# Patient Record
Sex: MALE | Race: WHITE | NOT HISPANIC OR LATINO | URBAN - NONMETROPOLITAN AREA
[De-identification: names, ages, dates, MRNs, and addresses within clinical notes are randomized per-mention and may not be internally consistent; named-entity substitution may affect disease eponyms.]

---

## 2020-03-30 ENCOUNTER — OFFICE VISIT (OUTPATIENT)
Dept: OTOLARYNGOLOGY | Facility: CLINIC | Age: 70
End: 2020-03-30

## 2020-03-30 VITALS — OXYGEN SATURATION: 99 % | WEIGHT: 144.8 LBS | BODY MASS INDEX: 20.73 KG/M2 | TEMPERATURE: 97.1 F | HEIGHT: 70 IN

## 2020-03-30 DIAGNOSIS — B02.8: Primary | ICD-10-CM

## 2020-03-30 PROCEDURE — 99203 OFFICE O/P NEW LOW 30 MIN: CPT | Performed by: OTOLARYNGOLOGY

## 2020-03-30 RX ORDER — ASPIRIN 81 MG/1
81 TABLET ORAL DAILY
COMMUNITY

## 2020-03-30 RX ORDER — NAPROXEN SODIUM 220 MG
220 TABLET ORAL 2 TIMES DAILY PRN
COMMUNITY

## 2020-03-30 NOTE — PROGRESS NOTES
"Subjective   Morteza Veras is a 69 y.o. male.     History of Present Illness     Patient reports that approximately 2 weeks ago he began having right-sided facial pain and then subsequently sharp shooting right-sided ear pain.  Was given some Allegra-D and then a little over a week ago states his right ear became \"plugged\".  Was seen at [facility] urgent care on 3/26/1930 and diagnosed with \"shingles in my ear\".  Was placed on Valtrex and Cortisporin.  Along with Keflex.  Reports he is much improved.  Feels like his hearing is almost back to what he considers normal.  Has several lesions on the right side of his face particularly around the chin and lip and also has a lesion on his ear externally.  None of these are bleeding.  Pain has improved significantly.    The following portions of the patient's history were reviewed and updated as appropriate: allergies, current medications, past family history, past medical history, past social history, past surgical history and problem list.      Morteza Veras reports that he has been smoking cigarettes. He started smoking about 40 years ago. He has a 40.00 pack-year smoking history. He has never used smokeless tobacco. Alcohol use questions deferred to the physician. Drug use questions deferred to the physician.  Patient is a tobacco user and has been counseled for use of tobacco products    History reviewed. No pertinent family history.    No Known Allergies      Current Outpatient Medications:   •  aspirin 81 MG EC tablet, Take 81 mg by mouth Daily., Disp: , Rfl:   •  ibuprofen (ADVIL,MOTRIN) 100 MG/5ML suspension, Take  by mouth Every 6 (Six) Hours As Needed for Mild Pain ., Disp: , Rfl:   •  naproxen sodium (ALEVE) 220 MG tablet, Take 220 mg by mouth 2 (Two) Times a Day As Needed., Disp: , Rfl:   •  cephalexin (KEFLEX) 500 MG capsule, Take 1 capsule by mouth 3 (Three) Times a Day., Disp: 21 capsule, Rfl: 0  •  neomycin-colistin-hydrocortisone-thonzonium " (CORTISPORIN TC) 3.3-3-10-0.5 MG/ML otic suspension, Administer 3 drops to the right ear 4 (Four) Times a Day for 7 days., Disp: 5 mL, Rfl: 0  •  valACYclovir (VALTREX) 1000 MG tablet, Take 1 tablet by mouth 3 (Three) Times a Day., Disp: 21 tablet, Rfl: 0    Past Medical History:   Diagnosis Date   • COPD (chronic obstructive pulmonary disease) (CMS/HCC)        Past Surgical History:   Procedure Laterality Date   • KNEE SURGERY           Review of Systems   HENT: Positive for ear discharge, ear pain, hearing loss and mouth sores.    Skin: Positive for rash.   Neurological: Positive for headaches.   Hematological: Bruises/bleeds easily.   Psychiatric/Behavioral:        Not assessed           Objective   Physical Exam  General: Well-developed well-nourished male in no acute distress.  Alert and oriented x3. Head: Normocephalic. Face: Facial nerve function is intact and symmetrical bilaterally. PERRL. EOMI. Voice:Strong. Speech:Fluent  Ears: The right external ear shows a crusted lesion of the root of the helix externally and at least 2 bullous lesions within the ear canal.  Tympanic membrane appears to be intact with no evidence of infection or effusion.  Left ear no discharge.  Tympanic membrane intact and clear.  Nose: Nares show no discharge mass polyp or purulence.  Boggy mucosa is present.  No gross external deformity.  Septum: Midline  Oral cavity: Lips and gums without lesions.  Tongue and floor of mouth without lesions.  Parotid and submandibular ducts unobstructed.  Has a couple of whitish lesions of the vestibule of the mouth on the right consistent with viral/herpetiform lesions.  Pharynx: No erythema exudate mass or ulcer  Neck: No lymphadenopathy.  No thyromegaly.  Trachea and larynx midline.  No masses in the parotid or submandibular glands.  Skin: Herpetiform crusted lesions of the right lower face extending up towards the vermilion border.      Assessment/Plan   Morteza was seen today for ear  problem.    Diagnoses and all orders for this visit:    Herpes zoster otitis externa      Plan: Findings are consistent with herpes zoster.  With the patient substantially improved subjectively will not add steroids at this point.  He should continue his valacyclovir until he has completed the course and continue eardrops twice a day for another 5 days.  Keep water out of his ear.  If he continues to improve he should return in 1 month at the [place] office with a hearing test to assess for any potential damage to/involvement of the 8th nerve.

## 2020-04-02 ENCOUNTER — TELEPHONE (OUTPATIENT)
Dept: OTOLARYNGOLOGY | Facility: CLINIC | Age: 70
End: 2020-04-02

## 2020-04-02 DIAGNOSIS — B02.29 HERPES ZOSTER VIRUS INFECTION OF FACE AND EAR NERVES: ICD-10-CM

## 2020-04-06 ENCOUNTER — TELEPHONE (OUTPATIENT)
Dept: OTOLARYNGOLOGY | Facility: CLINIC | Age: 70
End: 2020-04-06

## 2020-04-06 NOTE — TELEPHONE ENCOUNTER
04/06/2020, 1330 - Patient's pharmacy of choice, [facility], [place], [place] telephoned per this staff member [phone].  Spoke with Pharmacy Technician, Laila.  Laila made aware request for prescription medication renewal regarding Neomycin-Colistin-Hydrocortisone-Thonzonium (Cortisporin TC)3.3-3-10-0.5 MG/ML Otic Suspension was addressed per Dr. Colt Rodriguez M.D./ENT 04/02/2020.  Laila verbalized understanding.    Note - Receipt confirmed by [facility], [place], [place] 04/02/2020 at 10:06 A.M.

## 2020-04-06 NOTE — TELEPHONE ENCOUNTER
----- Message from Yaneth Mcintyre sent at 4/6/2020 11:32 AM CDT -----  Regarding: didi patient  Laila with [facility] in [place] called asking if okay  to substitution Neopoly HC drops again.  Was done verbally on 3-26.    [phone] (Laila)

## 2020-04-28 ENCOUNTER — OFFICE VISIT (OUTPATIENT)
Dept: OTOLARYNGOLOGY | Facility: CLINIC | Age: 70
End: 2020-04-28

## 2020-04-28 ENCOUNTER — CLINICAL SUPPORT (OUTPATIENT)
Dept: AUDIOLOGY | Facility: CLINIC | Age: 70
End: 2020-04-28

## 2020-04-28 VITALS — OXYGEN SATURATION: 98 % | WEIGHT: 146 LBS | BODY MASS INDEX: 20.9 KG/M2 | HEIGHT: 70 IN

## 2020-04-28 DIAGNOSIS — H83.3X3 NOISE-INDUCED HEARING LOSS OF BOTH EARS: ICD-10-CM

## 2020-04-28 DIAGNOSIS — H61.21 IMPACTED CERUMEN OF RIGHT EAR: ICD-10-CM

## 2020-04-28 DIAGNOSIS — H90.3 SENSORINEURAL HEARING LOSS OF BOTH EARS: Primary | ICD-10-CM

## 2020-04-28 DIAGNOSIS — H90.3 SENSORINEURAL HEARING LOSS, BILATERAL: Primary | ICD-10-CM

## 2020-04-28 DIAGNOSIS — H92.01 RIGHT EAR PAIN: ICD-10-CM

## 2020-04-28 PROCEDURE — 92567 TYMPANOMETRY: CPT | Performed by: AUDIOLOGIST

## 2020-04-28 PROCEDURE — 99213 OFFICE O/P EST LOW 20 MIN: CPT | Performed by: OTOLARYNGOLOGY

## 2020-04-28 PROCEDURE — 92557 COMPREHENSIVE HEARING TEST: CPT | Performed by: AUDIOLOGIST

## 2020-04-28 NOTE — PROGRESS NOTES
STANDARD AUDIOMETRIC EVALUATION      Name:  Morteza Veras  :  1950  Age:  69 y.o.  Date of Evaluation:  2020      HISTORY    Reason for visit:  Morteza Veras is seen today for a hearing test at the request of Dr. Colt Rodriguez.  Patient reports he had shingles in his right ear, and his hearing was stopped up for 4-5 days.  He states he doesn't have any more ear pain.  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        EVALUATION    See Audiogram    RESULTS        Otoscopy and Tympanometry 226 Hz :  Right Ear:  Otoscopy:  Cerumen impaction, Testing completed after ears were cleaned          Tympanometry:  Middle ear function within normal limits    Left Ear:   Otoscopy:  Clear ear canal        Tympanometry:  Middle ear function within normal limits    Test technique:  Standard Audiometry     Pure Tone Audiometry:   Patient responded to pure tones at 25-85 dB for 250-8000 Hz in right ear, and at 30-75 dB for 250-8000 Hz in left ear.       Speech Audiometry:        Right Ear:  Speech Reception Threshold (SRT) was obtained at 35 dBHL                 Speech Discrimination scores were 60% in quiet when words were presented at 75 dBHL       Left Ear:  Speech Reception Threshold (SRT) was obtained at 30 dBHL                 Speech Discrimination scores were 76% in quiet when words were presented at 70 dBHL    Reliability:   good    IMPRESSIONS:  1.  Tympanometry results are consistent with Middle ear function within normal limits in both ears.  2.  Pure tone results are consistent with mild to severe sloping sensorineural hearing loss  for both ears.       RECOMMENDATIONS:  Patient is seeing the Ear Nose and Throat physician immediately following this examination.  It was a pleasure seeing Morteza Veras in Audiology today.  We would be happy to do further testing or discuss these test as necessary.          This document has been electronically  signed by Christine Torres, MS HOUSE-A on April 28, 2020 15:33       Christine Torres, MS HOUSE-A  Licensed Audiologist

## 2020-04-28 NOTE — PROGRESS NOTES
Subjective   Morteza Veras is a 69 y.o. male.       History of Present Illness     Patient was seen previously with what appear to be herpes zoster of the ear canal.  Has completed his Valtrex and Cortisporin drops.  Thinks his right ear feels stopped up but is not hurting.  No otorrhea.  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    The following portions of the patient's history were reviewed and updated as appropriate: allergies, current medications, past family history, past medical history, past social history, past surgical history and problem list.     reports that he has been smoking cigarettes. He started smoking about 40 years ago. He has a 40.00 pack-year smoking history. He has never used smokeless tobacco. Alcohol use questions deferred to the physician. Drug use questions deferred to the physician.   Patient is a tobacco user and has been counseled for use of tobacco products      Review of Systems   Constitutional: Negative for fever.   HENT: Positive for hearing loss.            Objective   Physical Exam  Ears: External ears no deformity.  Left ear canal shows no discharge.  Tympanic membrane intact and clear.  Right ear canal shows extensive crusting that is occluding the ear canal and covering the tympanic membrane.  This is removed under the microscope using instrumentation.  Some modest bleeding occurs that is controlled with topical application of Jeremías-Synephrine on cotton followed by light cautery with silver nitrate.  Tympanic membrane is intact and clear.  There are no remaining external herpetiform lesions.    Audiogram is obtained and reviewed and shows a bilateral mild sloping to severe sensorineural hearing loss.  Tympanograms are type a bilaterally.  Discrimination scores are 76% on the left 60% on the right.  This is largely symmetrical.  Hearing is slightly worse in the high pitches from 2000 to 8000  Hz.          Assessment/Plan   Morteza was seen today for follow-up.    Diagnoses and all orders for this visit:    Sensorineural hearing loss of both ears        Plan: Ear cleaned as described above.  Reassured the patient there did not appear to be any substantial hearing loss that was caused by the zoster.  No further treatment needed from my standpoint.  Could consider amplification if he so desires.  Follow-up with me as needed.

## 2021-06-29 ENCOUNTER — OFFICE VISIT (OUTPATIENT)
Dept: FAMILY MEDICINE CLINIC | Facility: CLINIC | Age: 71
End: 2021-06-29

## 2021-06-29 VITALS
BODY MASS INDEX: 19.61 KG/M2 | DIASTOLIC BLOOD PRESSURE: 84 MMHG | TEMPERATURE: 96.4 F | SYSTOLIC BLOOD PRESSURE: 142 MMHG | WEIGHT: 137 LBS | OXYGEN SATURATION: 98 % | HEART RATE: 92 BPM | HEIGHT: 70 IN

## 2021-06-29 DIAGNOSIS — L03.115 CELLULITIS OF RIGHT LOWER EXTREMITY: ICD-10-CM

## 2021-06-29 DIAGNOSIS — Z80.1 FAMILY HISTORY OF LUNG CANCER: ICD-10-CM

## 2021-06-29 DIAGNOSIS — Z00.00 MEDICARE ANNUAL WELLNESS VISIT, SUBSEQUENT: Primary | ICD-10-CM

## 2021-06-29 DIAGNOSIS — Z11.59 NEED FOR HEPATITIS C SCREENING TEST: ICD-10-CM

## 2021-06-29 DIAGNOSIS — Z00.00 WELL ADULT EXAM: ICD-10-CM

## 2021-06-29 DIAGNOSIS — Z12.11 COLON CANCER SCREENING: ICD-10-CM

## 2021-06-29 DIAGNOSIS — Z72.0 TOBACCO ABUSE: Chronic | ICD-10-CM

## 2021-06-29 DIAGNOSIS — Z87.891 PERSONAL HISTORY OF NICOTINE DEPENDENCE: Chronic | ICD-10-CM

## 2021-06-29 DIAGNOSIS — Z80.0 FAMILY HISTORY OF COLON CANCER: ICD-10-CM

## 2021-06-29 DIAGNOSIS — Z12.11 ENCOUNTER FOR SCREENING FOR MALIGNANT NEOPLASM OF COLON: ICD-10-CM

## 2021-06-29 DIAGNOSIS — M21.622 TAILOR'S BUNIONETTE, LEFT: ICD-10-CM

## 2021-06-29 PROBLEM — M79.672 LEFT FOOT PAIN: Status: ACTIVE | Noted: 2020-12-11

## 2021-06-29 PROBLEM — M25.80 SESAMOIDITIS: Status: ACTIVE | Noted: 2020-12-11

## 2021-06-29 PROCEDURE — 96372 THER/PROPH/DIAG INJ SC/IM: CPT | Performed by: NURSE PRACTITIONER

## 2021-06-29 PROCEDURE — G0438 PPPS, INITIAL VISIT: HCPCS | Performed by: NURSE PRACTITIONER

## 2021-06-29 PROCEDURE — 99213 OFFICE O/P EST LOW 20 MIN: CPT | Performed by: NURSE PRACTITIONER

## 2021-06-29 PROCEDURE — 1125F AMNT PAIN NOTED PAIN PRSNT: CPT | Performed by: NURSE PRACTITIONER

## 2021-06-29 PROCEDURE — 1159F MED LIST DOCD IN RCRD: CPT | Performed by: NURSE PRACTITIONER

## 2021-06-29 PROCEDURE — 96160 PT-FOCUSED HLTH RISK ASSMT: CPT | Performed by: NURSE PRACTITIONER

## 2021-06-29 RX ORDER — CEFTRIAXONE 1 G/1
1 INJECTION, POWDER, FOR SOLUTION INTRAMUSCULAR; INTRAVENOUS ONCE
Status: COMPLETED | OUTPATIENT
Start: 2021-06-29 | End: 2021-06-29

## 2021-06-29 RX ORDER — SULFAMETHOXAZOLE AND TRIMETHOPRIM 800; 160 MG/1; MG/1
1 TABLET ORAL 2 TIMES DAILY
Qty: 20 TABLET | Refills: 0 | Status: SHIPPED | OUTPATIENT
Start: 2021-06-29 | End: 2021-07-09

## 2021-06-29 RX ADMIN — CEFTRIAXONE 1 G: 1 INJECTION, POWDER, FOR SOLUTION INTRAMUSCULAR; INTRAVENOUS at 10:13

## 2021-06-29 NOTE — PROGRESS NOTES
"Chief Complaint  Establish Care and Medicare Wellness-Initial Visit    Subjective          The patient presents today to establish care. He was seeing Dr. Avitia and recently had bunion surgery with podiatrist in [facility]. He is having increased pain, swelling, and redness with this feels like it could be infected. He has been doing Cologuard and now wonders if he needs a colonoscopy instead. Reports mother having had colon cancer.     He reports his great toe becomes stiff and numb. The patient has used Neosporin, washes the affected area with soap and water on the great toe, and he states that his surgery was 04/01/2021. He denies taking a low-dose aspirin, and he does takes Aleve. He adds that he was prescribed oxycodone from his surgery, 10 pills were prescribed and he states that he did not take them all following his surgery, however, he did take one on 06/26/2021. He denies fever. The patient has no allergies to medications.     Additionally, he reports that he smokes approximately 1 pack of cigarettes per day. The patient states that he had his last Cologuard approximately 1 year ago. He has had both doses of his COVID-19 vaccination. He is interested in tetanus and pneumonia vaccines as well.     Morteza Versa presents to [facility]  History of Present Illness    Objective   Vital Signs:   /84 (BP Location: Left arm, Patient Position: Sitting, Cuff Size: Adult)   Pulse 92   Temp 96.4 °F (35.8 °C) (Tympanic)   Ht 177.8 cm (70\")   Wt 62.1 kg (137 lb)   SpO2 98%   BMI 19.66 kg/m²     Physical Exam  Vitals and nursing note reviewed.   Constitutional:       General: He is not in acute distress.     Appearance: Normal appearance. He is normal weight. He is not ill-appearing, toxic-appearing or diaphoretic.   HENT:      Head: Normocephalic and atraumatic.   Neck:      Vascular: No carotid bruit.   Cardiovascular:      Rate and Rhythm: Normal rate " and regular rhythm.      Pulses: Normal pulses.      Heart sounds: Normal heart sounds. No murmur heard.   No friction rub. No gallop.    Pulmonary:      Effort: Pulmonary effort is normal. No respiratory distress.      Breath sounds: No stridor. Examination of the right-upper field reveals decreased breath sounds. Examination of the left-upper field reveals decreased breath sounds. Examination of the right-middle field reveals decreased breath sounds. Examination of the left-middle field reveals decreased breath sounds. Examination of the right-lower field reveals decreased breath sounds. Examination of the left-lower field reveals decreased breath sounds. Decreased breath sounds present. No wheezing, rhonchi or rales.      Comments: Lung fields are diminished throughout. Pulse ox on RA 98%  Musculoskeletal:         General: Tenderness present.   Skin:     General: Skin is warm and dry.      Findings: Erythema and wound present.             Comments: Medial aspect of right foot distally around where his previous bunion surgery was performed is edematous, erythematous, tender to palpation. Wound draining some light yellow drainage.    Neurological:      Mental Status: He is alert and oriented to person, place, and time.   Psychiatric:         Mood and Affect: Mood normal.         Behavior: Behavior normal.         Thought Content: Thought content normal.         Judgment: Judgment normal.        Result Review :                   Assessment and Plan    Diagnoses and all orders for this visit:    1. Medicare annual wellness visit, subsequent (Primary)    2. Encounter for screening for malignant neoplasm of colon  -     Ambulatory Referral For Screening Colonoscopy    3. Tailor's bunionette, left    4. Cellulitis of right lower extremity  -     cefTRIAXone (ROCEPHIN) injection 1 g    5. Well adult exam  -     CBC & Differential; Future  -     Comprehensive metabolic panel; Future  -     Lipid panel; Future    6. Family  history of colon cancer  Comments:  mother    Orders:  -     Ambulatory Referral For Screening Colonoscopy    7. Colon cancer screening  -     Ambulatory Referral For Screening Colonoscopy    8. Need for hepatitis C screening test  -     Hepatitis C Antibody; Future    9. Tobacco abuse  -     Cancel: XR Chest 2 View  -      CT Chest Low Dose Cancer Screening WO    10. Personal history of nicotine dependence   -      CT Chest Low Dose Cancer Screening WO    11. Family history of lung cancer  Comments:  sister    Other orders  -     sulfamethoxazole-trimethoprim (Bactrim DS) 800-160 MG per tablet; Take 1 tablet by mouth 2 (Two) Times a Day for 10 days.  Dispense: 20 tablet; Refill: 0      He will be referred for colonoscopy. He is given Rocephin 1 g in office today, tye well and is treated with Bactrim po as above. If symptoms should persist or worsen, he will return to clinic for follow-up. Advised to monitor symptoms closely and cleanse with soapy water and cover with bandage and use Neosporin.    Chest x-ray and lab work are ordered for him. The lab work will be drawn while fasting at his next convenience.     Health maintenance is reviewed. Lung cancer screening is ordered. We will hold on immunizations at this point until his infection clears. Tobacco cessation is also encouraged today. Medicare wellness is completed today. CXR DC'd order and low dose CT for lung cancer screening ordered instead.     All questions and concerns are addressed with understanding noted. The patient is in agreement to above plan.    I spent 33 minutes caring for Morteza on this date of service. This time includes time spent by me in the following activities:preparing for the visit, performing a medically appropriate examination and/or evaluation , counseling and educating the patient/family/caregiver, ordering medications, tests, or procedures, referring and communicating with other health care professionals  and documenting information  in the medical record  Follow Up   Return if symptoms worsen or fail to improve, for or sooner as needed, Recheck.  Patient was given instructions and counseling regarding his condition or for health maintenance advice. Please see specific information pulled into the AVS if appropriate.     Scribed for TRICIA Landin by Sarah Nava.  06/29/21   13:08 CDT    I have personally performed the services described in this document as scribed by the above individual, and it is both accurate and complete.  TRICIA Landin  6/29/2021  13:12 CDT

## 2021-06-29 NOTE — PROGRESS NOTES
The ABCs of the Annual Wellness Visit  Initial Medicare Wellness Visit    Chief Complaint   Patient presents with   • Establish Care   • Medicare Wellness-Initial Visit       Subjective   History of Present Illness:  Morteza Veras is a 70 y.o. male who presents for an Initial Medicare Wellness Visit.    HEALTH RISK ASSESSMENT    Recent Hospitalizations:  No hospitalization(s) within the last year.    Current Medical Providers:  Patient Care Team:  Morteza Feldman MD as PCP - General (Family Medicine)  Jose Miguel Chu DPM as Consulting Physician (Podiatry)    Smoking Status:  Social History     Tobacco Use   Smoking Status Current Every Day Smoker   • Packs/day: 1.00   • Years: 40.00   • Pack years: 40.00   • Types: Cigarettes   • Start date: 1980   Smokeless Tobacco Never Used       Alcohol Consumption:  Social History     Substance and Sexual Activity   Alcohol Use Defer       Depression Screen:   PHQ-2/PHQ-9 Depression Screening 6/29/2021   Little interest or pleasure in doing things 0   Feeling down, depressed, or hopeless 0   Total Score 0       Fall Risk Screen:  STEADI Fall Risk Assessment was completed, and patient is at LOW risk for falls.Assessment completed on:6/29/2021    Health Habits and Functional and Cognitive Screening:  Functional & Cognitive Status 6/29/2021   Do you have difficulty preparing food and eating? No   Do you have difficulty bathing yourself, getting dressed or grooming yourself? No   Do you have difficulty using the toilet? No   Do you have difficulty moving around from place to place? No   Do you have trouble with steps or getting out of a bed or a chair? No   Current Diet Well Balanced Diet   Dental Exam Not up to date   Eye Exam Up to date   Exercise (times per week) 7 times per week   Current Exercises Include Walking   Do you need help using the phone?  No   Are you deaf or do you have serious difficulty hearing?  No   Do you need help with transportation? No    Do you need help shopping? No   Do you need help preparing meals?  No   Do you need help with housework?  No   Do you need help with laundry? No   Do you need help taking your medications? No   Do you need help managing money? No   Do you ever drive or ride in a car without wearing a seat belt? No   Have you felt unusual stress, anger or loneliness in the last month? No   Who do you live with? Spouse   If you need help, do you have trouble finding someone available to you? No   Have you been bothered in the last four weeks by sexual problems? No   Do you have difficulty concentrating, remembering or making decisions? No         Does the patient have evidence of cognitive impairment? No    Asprin use counseling:Start ASA 81 mg daily     Age-appropriate Screening Schedule:  Refer to the list below for future screening recommendations based on patient's age, sex and/or medical conditions. Orders for these recommended tests are listed in the plan section. The patient has been provided with a written plan.    Health Maintenance   Topic Date Due   • TDAP/TD VACCINES (1 - Tdap) Never done   • INFLUENZA VACCINE  08/01/2021   • ZOSTER VACCINE  Completed          The following portions of the patient's history were reviewed and updated as appropriate: allergies, current medications, past family history, past medical history, past social history, past surgical history and problem list.    Outpatient Medications Prior to Visit   Medication Sig Dispense Refill   • aspirin 81 MG EC tablet Take 81 mg by mouth Daily.     • ibuprofen (ADVIL,MOTRIN) 100 MG/5ML suspension Take  by mouth Every 6 (Six) Hours As Needed for Mild Pain .     • naproxen sodium (ALEVE) 220 MG tablet Take 220 mg by mouth 2 (Two) Times a Day As Needed.       No facility-administered medications prior to visit.       Patient Active Problem List   Diagnosis   • Left foot pain   • Sesamoiditis   • Tailor's bunionette, left       Advanced Care Planning:  ACP  "discussion was declined by the patient. Patient does not have an advance directive, declines further assistance.    Review of Systems    Compared to one year ago, the patient feels his physical health is better.  Compared to one year ago, the patient feels his mental health is better.    Reviewed chart for potential of high risk medication in the elderly: yes  Reviewed chart for potential of harmful drug interactions in the elderly:yes    Objective         Vitals:    06/29/21 0922   BP: 142/84   BP Location: Left arm   Patient Position: Sitting   Cuff Size: Adult   Pulse: 92   Temp: 96.4 °F (35.8 °C)   TempSrc: Tympanic   SpO2: 98%   Weight: 62.1 kg (137 lb)   Height: 177.8 cm (70\")   PainSc:   3   PainLoc: Foot  Comment: right       Body mass index is 19.66 kg/m².  Discussed the patient's BMI with him. The BMI is in the acceptable range.    Physical Exam          Assessment/Plan   Medicare Risks and Personalized Health Plan  CMS Preventative Services Quick Reference  Advance Directive Discussion  Colon Cancer Screening  Dementia/Memory   Depression/Dysphoria  Diabetic Lab Screening   Fall Risk  Immunizations Discussed/Encouraged (specific immunizations; Td and Pneumococcal 23 )  Lung Cancer Risk  Osteoporosis Risk  Polypharmacy  Tobacco Use/Dependance (use dotphrase .tobaccocessation for documentation)    The above risks/problems have been discussed with the patient.  Pertinent information has been shared with the patient in the After Visit Summary.  Follow up plans and orders are seen below in the Assessment/Plan Section.    Diagnoses and all orders for this visit:    1. Medicare annual wellness visit, subsequent (Primary)    2. Encounter for screening for malignant neoplasm of colon  -     Ambulatory Referral For Screening Colonoscopy    3. Tailor's bunionette, left    4. Cellulitis of right lower extremity  -     cefTRIAXone (ROCEPHIN) injection 1 g    5. Well adult exam  -     CBC & Differential; Future  -     " Comprehensive metabolic panel; Future  -     Lipid panel; Future    6. Family history of colon cancer  Comments:  mother    Orders:  -     Ambulatory Referral For Screening Colonoscopy    7. Colon cancer screening  -     Ambulatory Referral For Screening Colonoscopy    8. Need for hepatitis C screening test  -     Hepatitis C Antibody; Future    9. Tobacco abuse  -     Cancel: XR Chest 2 View  -      CT Chest Low Dose Cancer Screening WO    10. Personal history of nicotine dependence   -      CT Chest Low Dose Cancer Screening WO    11. Family history of lung cancer  Comments:  sister    Other orders  -     sulfamethoxazole-trimethoprim (Bactrim DS) 800-160 MG per tablet; Take 1 tablet by mouth 2 (Two) Times a Day for 10 days.  Dispense: 20 tablet; Refill: 0      Follow Up:  Return if symptoms worsen or fail to improve, for or sooner as needed, Recheck.     An After Visit Summary and PPPS were given to the patient.

## 2021-07-14 DIAGNOSIS — Z87.891 PERSONAL HISTORY OF NICOTINE DEPENDENCE: ICD-10-CM

## 2021-07-14 DIAGNOSIS — F17.200 TOBACCO DEPENDENCE: Primary | ICD-10-CM

## 2021-07-14 DIAGNOSIS — R91.1 LUNG NODULE: ICD-10-CM

## 2022-06-07 ENCOUNTER — OFFICE VISIT (OUTPATIENT)
Dept: FAMILY MEDICINE CLINIC | Facility: CLINIC | Age: 72
End: 2022-06-07

## 2022-06-07 VITALS
HEIGHT: 70 IN | HEART RATE: 59 BPM | SYSTOLIC BLOOD PRESSURE: 138 MMHG | WEIGHT: 136.3 LBS | DIASTOLIC BLOOD PRESSURE: 70 MMHG | OXYGEN SATURATION: 100 % | BODY MASS INDEX: 19.51 KG/M2

## 2022-06-07 DIAGNOSIS — R29.898 WEAKNESS OF BOTH LOWER EXTREMITIES: Primary | ICD-10-CM

## 2022-06-07 DIAGNOSIS — R79.9 ABNORMAL FINDING OF BLOOD CHEMISTRY, UNSPECIFIED: ICD-10-CM

## 2022-06-07 DIAGNOSIS — R09.89 DECREASED DORSALIS PEDIS PULSE: ICD-10-CM

## 2022-06-07 DIAGNOSIS — R20.0 BILATERAL LEG NUMBNESS: ICD-10-CM

## 2022-06-07 DIAGNOSIS — Z12.5 PROSTATE CANCER SCREENING: ICD-10-CM

## 2022-06-07 DIAGNOSIS — Z13.6 SCREENING FOR AAA (ABDOMINAL AORTIC ANEURYSM): ICD-10-CM

## 2022-06-07 DIAGNOSIS — Z72.0 TOBACCO ABUSE: ICD-10-CM

## 2022-06-07 PROCEDURE — 99214 OFFICE O/P EST MOD 30 MIN: CPT | Performed by: NURSE PRACTITIONER

## 2022-06-07 NOTE — PROGRESS NOTES
Chief Complaint  Leg Pain (Both legs will feel heavy, and numb. Pt states it comes and goes about every day.)    Subjective        Morteza Veras presents to [facility] [place]     The patient presents today for evaluation of bilateral lower extremity heaviness and numbness. He reports bumping into each other at [employer] a couple of years ago and stated both his legs were heavy. The patient adds that he still has the heaviness in his legs, but states it is not every day. The patient adds that he did not have any symptoms walking in the parking lot from the car to inside the facility for his appointment. He relays that his symptoms are intermittent. He reports that he is still tired. The patient denies any back pain. He denies any shooting pain down to his foot. The patient adds  that he has some tingling, and at night his feet will twitch. He reports that if it is hot weather, he will have a few muscle spasms, and adds that he drinks pickle juice, and they are gone within ten seconds. He reports that he had a screening for peripheral arterial disease last week. He states that his insurance rep/health provider comes by once a year and checks his blood pressure, thorough his insurance [employer]. The patient relays that he has not had an ultrasound yet although he was told he likely had PAD and was advised to start taking an ASA 81mg. The patient adds that he has heaviness, and  some numbness from his hips to his knees on both legs. He states that his symptoms are 7 days a week, but it might not last over a minute or so each. He reports that he is smoking approximately 1 pack of cigarettes per day. The patient states that he has been smoking for 50 years. He reports that he would love to try the patch again.    He reports that his cholesterol may be up.    The patient reports that he has a lung scan scheduled for 07/11/2022. He reports that he will have another colon  "screening later this year. The patient is due for a AAA (abdominal aortic aneurysm) screening.    The following portions of the patient's history were reviewed and updated as appropriate: allergies, current medications, past family history, past medical history, past social history, past surgical history and problem list.    History of Present Illness     Objective   Vital Signs:  /70   Pulse 59   Ht 177.8 cm (70\")   Wt 61.8 kg (136 lb 4.8 oz)   SpO2 100%   BMI 19.56 kg/m²   Estimated body mass index is 19.56 kg/m² as calculated from the following:    Height as of this encounter: 177.8 cm (70\").    Weight as of this encounter: 61.8 kg (136 lb 4.8 oz).    Physical Exam  Vitals and nursing note reviewed.   Constitutional:       General: He is not in acute distress.     Appearance: Normal appearance. He is not ill-appearing, toxic-appearing or diaphoretic.   HENT:      Head: Normocephalic and atraumatic.   Neck:      Vascular: No carotid bruit.   Cardiovascular:      Rate and Rhythm: Regular rhythm.      Pulses:           Dorsalis pedis pulses are 1+ on the right side and 1+ on the left side.        Posterior tibial pulses are 1+ on the right side and 1+ on the left side.      Heart sounds: Normal heart sounds. No murmur heard.    No friction rub. No gallop.   Pulmonary:      Effort: Pulmonary effort is normal. No respiratory distress.      Breath sounds: Normal breath sounds. No stridor. No wheezing or rhonchi.      Comments: Lung fields diminished throughout.  Musculoskeletal:      Lumbar back: No tenderness or bony tenderness. Normal range of motion. Negative right straight leg raise test and negative left straight leg raise test. No scoliosis.      Right lower leg: No edema.      Left lower leg: No edema.        Legs:       Comments: He does have a wound to the anterior medial aspect of the right shin, which has been present for the last 2 weeks, healing slowly. Decreased PT and DP pulses palpable " bilaterally. Reflexes intact at the knees bilaterally. No tenderness to palpation of the lumbar spine.he is ambulatory without assist, gait is steady , not guarded.    Skin:     General: Skin is warm and dry.      Coloration: Skin is not jaundiced or pale.      Findings: No bruising, erythema, lesion or rash.   Neurological:      Mental Status: He is alert and oriented to person, place, and time.      Cranial Nerves: No cranial nerve deficit.      Motor: No weakness.      Coordination: Coordination normal.      Gait: Gait normal.      Deep Tendon Reflexes: Reflexes normal.   Psychiatric:         Mood and Affect: Mood normal.         Behavior: Behavior normal.         Thought Content: Thought content normal.         Judgment: Judgment normal.        Result Review :                  Assessment and Plan   Diagnoses and all orders for this visit:    1. Weakness of both lower extremities (Primary)  -     XR Spine Lumbar 2 or 3 View  -     US Arterial Doppler Lower Extremity Complete  -     CBC & Differential; Future  -     Comprehensive metabolic panel; Future  -     Lipid panel; Future  -     TSH; Future    2. Tobacco abuse  -     XR Spine Lumbar 2 or 3 View  -     US Arterial Doppler Lower Extremity Complete  -     CBC & Differential; Future  -     Comprehensive metabolic panel; Future  -     Lipid panel; Future  -     TSH; Future    3. Decreased dorsalis pedis pulse  -     XR Spine Lumbar 2 or 3 View  -     US Arterial Doppler Lower Extremity Complete  -     CBC & Differential; Future  -     Comprehensive metabolic panel; Future  -     Lipid panel; Future  -     TSH; Future    4. Screening for AAA (abdominal aortic aneurysm)  -     Abdominal Aortic Aneurysm Screening Medicare CAR; Future    5. Bilateral leg numbness  -     XR Spine Lumbar 2 or 3 View  -     US Arterial Doppler Lower Extremity Complete  -     CBC & Differential; Future  -     Comprehensive metabolic panel; Future  -     Lipid panel; Future  -     TSH;  Future    6. Prostate cancer screening  -     PSA Screen; Future    7. Abnormal finding of blood chemistry, unspecified   -     Lipid panel; Future    We will obtain AAA (abdominal aortic aneurysm) screening and labs as above when fasting. He intends on having all of these performed next week after his sister has surgery tomorrow. I will obtain arterial ultrasound on bilateral lower extremities and he will have repeat low dose chest CT as scheduled in 07/2022. We will follow up with him regarding results and refer her to him as necessary. We have discussed smoking cessation and he states that he intends on using NicoDerm patches, does desire smoking cessation. All questions and concerns are addressed with understanding noted. The patient is in agreement to above plan.    I spent 30 minutes caring for 33 on this date of service. This time includes time spent by me in the following activities: preparing for the visit, reviewing tests, performing a medically appropriate examination and/or evaluation, counseling and educating the patient/family/caregiver, ordering medications, tests, or procedures and documenting information in the medical record       I spent 33 minutes caring for Morteza on this date of service. This time includes time spent by me in the following activities:preparing for the visit, performing a medically appropriate examination and/or evaluation , counseling and educating the patient/family/caregiver, ordering medications, tests, or procedures and documenting information in the medical record  Follow Up   Return if symptoms worsen or fail to improve, for Recheck, or sooner as needed.  Patient was given instructions and counseling regarding his condition or for health maintenance advice. Please see specific information pulled into the AVS if appropriate.     Transcribed from ambient dictation for TRICIA Landin by SIENNA FRIAS.  06/07/22   14:44 CDT    Patient verbalized consent to the visit  recording.  I have personally performed the services described in this document as transcribed by the above individual, and it is both accurate and complete.  Princess Cortez, APRN  6/7/2022  15:16 CDT

## 2022-06-29 DIAGNOSIS — I73.9 PAD (PERIPHERAL ARTERY DISEASE): Primary | ICD-10-CM

## 2022-06-29 RX ORDER — ATORVASTATIN CALCIUM 40 MG/1
40 TABLET, FILM COATED ORAL DAILY
Qty: 90 TABLET | Refills: 0 | Status: SHIPPED | OUTPATIENT
Start: 2022-06-29 | End: 2022-10-10 | Stop reason: SDUPTHER

## 2022-07-01 ENCOUNTER — LAB (OUTPATIENT)
Dept: LAB | Facility: OTHER | Age: 72
End: 2022-07-01

## 2022-07-01 DIAGNOSIS — R79.9 ABNORMAL FINDING OF BLOOD CHEMISTRY, UNSPECIFIED: ICD-10-CM

## 2022-07-01 DIAGNOSIS — Z12.5 PROSTATE CANCER SCREENING: ICD-10-CM

## 2022-07-01 DIAGNOSIS — Z72.0 TOBACCO ABUSE: ICD-10-CM

## 2022-07-01 DIAGNOSIS — R20.0 BILATERAL LEG NUMBNESS: ICD-10-CM

## 2022-07-01 DIAGNOSIS — R09.89 DECREASED DORSALIS PEDIS PULSE: ICD-10-CM

## 2022-07-01 DIAGNOSIS — R29.898 WEAKNESS OF BOTH LOWER EXTREMITIES: ICD-10-CM

## 2022-07-01 LAB
ALBUMIN SERPL-MCNC: 3.6 G/DL (ref 3.5–5)
ALBUMIN/GLOB SERPL: 1.5 G/DL (ref 1.1–1.8)
ALP SERPL-CCNC: 72 U/L (ref 38–126)
ALT SERPL W P-5'-P-CCNC: 16 U/L
ANION GAP SERPL CALCULATED.3IONS-SCNC: 4 MMOL/L (ref 5–15)
AST SERPL-CCNC: 25 U/L (ref 17–59)
BASOPHILS # BLD AUTO: 0.03 10*3/MM3 (ref 0–0.2)
BASOPHILS NFR BLD AUTO: 0.5 % (ref 0–1.5)
BILIRUB SERPL-MCNC: 0.7 MG/DL (ref 0.2–1.3)
BUN SERPL-MCNC: 9 MG/DL (ref 7–23)
BUN/CREAT SERPL: 11.3 (ref 7–25)
CALCIUM SPEC-SCNC: 8.7 MG/DL (ref 8.4–10.2)
CHLORIDE SERPL-SCNC: 101 MMOL/L (ref 101–112)
CHOLEST SERPL-MCNC: 187 MG/DL (ref 150–200)
CO2 SERPL-SCNC: 29 MMOL/L (ref 22–30)
CREAT SERPL-MCNC: 0.8 MG/DL (ref 0.7–1.3)
DEPRECATED RDW RBC AUTO: 43.8 FL (ref 37–54)
EGFRCR SERPLBLD CKD-EPI 2021: 94.6 ML/MIN/1.73
EOSINOPHIL # BLD AUTO: 0.25 10*3/MM3 (ref 0–0.4)
EOSINOPHIL NFR BLD AUTO: 4.2 % (ref 0.3–6.2)
ERYTHROCYTE [DISTWIDTH] IN BLOOD BY AUTOMATED COUNT: 12.9 % (ref 12.3–15.4)
GLOBULIN UR ELPH-MCNC: 2.4 GM/DL (ref 2.3–3.5)
GLUCOSE SERPL-MCNC: 93 MG/DL (ref 70–99)
HCT VFR BLD AUTO: 41.8 % (ref 37.5–51)
HDLC SERPL-MCNC: 71 MG/DL (ref 40–59)
HGB BLD-MCNC: 14.2 G/DL (ref 13–17.7)
LDLC SERPL CALC-MCNC: 102 MG/DL
LDLC/HDLC SERPL: 1.41 {RATIO} (ref 0–3.55)
LYMPHOCYTES # BLD AUTO: 2.17 10*3/MM3 (ref 0.7–3.1)
LYMPHOCYTES NFR BLD AUTO: 36.7 % (ref 19.6–45.3)
MCH RBC QN AUTO: 32.5 PG (ref 26.6–33)
MCHC RBC AUTO-ENTMCNC: 34 G/DL (ref 31.5–35.7)
MCV RBC AUTO: 95.7 FL (ref 79–97)
MONOCYTES # BLD AUTO: 0.87 10*3/MM3 (ref 0.1–0.9)
MONOCYTES NFR BLD AUTO: 14.7 % (ref 5–12)
NEUTROPHILS NFR BLD AUTO: 2.6 10*3/MM3 (ref 1.7–7)
NEUTROPHILS NFR BLD AUTO: 43.9 % (ref 42.7–76)
PLATELET # BLD AUTO: 249 10*3/MM3 (ref 140–450)
PMV BLD AUTO: 8.5 FL (ref 6–12)
POTASSIUM SERPL-SCNC: 4 MMOL/L (ref 3.4–5)
PROT SERPL-MCNC: 6 G/DL (ref 6.3–8.6)
PSA SERPL-MCNC: 0.56 NG/ML (ref 0–4)
RBC # BLD AUTO: 4.37 10*6/MM3 (ref 4.14–5.8)
SODIUM SERPL-SCNC: 134 MMOL/L (ref 137–145)
TRIGL SERPL-MCNC: 79 MG/DL
TSH SERPL DL<=0.05 MIU/L-ACNC: 1.37 UIU/ML (ref 0.27–4.2)
VLDLC SERPL-MCNC: 14 MG/DL (ref 5–40)
WBC NRBC COR # BLD: 5.92 10*3/MM3 (ref 3.4–10.8)

## 2022-07-01 PROCEDURE — 84443 ASSAY THYROID STIM HORMONE: CPT | Performed by: NURSE PRACTITIONER

## 2022-07-01 PROCEDURE — 85025 COMPLETE CBC W/AUTO DIFF WBC: CPT | Performed by: NURSE PRACTITIONER

## 2022-07-01 PROCEDURE — 36415 COLL VENOUS BLD VENIPUNCTURE: CPT | Performed by: NURSE PRACTITIONER

## 2022-07-01 PROCEDURE — 80061 LIPID PANEL: CPT | Performed by: NURSE PRACTITIONER

## 2022-07-01 PROCEDURE — G0103 PSA SCREENING: HCPCS | Performed by: NURSE PRACTITIONER

## 2022-07-01 PROCEDURE — 80053 COMPREHEN METABOLIC PANEL: CPT | Performed by: NURSE PRACTITIONER

## 2022-07-05 ENCOUNTER — OFFICE VISIT (OUTPATIENT)
Dept: FAMILY MEDICINE CLINIC | Facility: CLINIC | Age: 72
End: 2022-07-05

## 2022-07-05 VITALS
SYSTOLIC BLOOD PRESSURE: 118 MMHG | HEART RATE: 97 BPM | BODY MASS INDEX: 19.45 KG/M2 | WEIGHT: 135.9 LBS | OXYGEN SATURATION: 100 % | HEIGHT: 70 IN | DIASTOLIC BLOOD PRESSURE: 70 MMHG

## 2022-07-05 DIAGNOSIS — Z00.00 MEDICARE ANNUAL WELLNESS VISIT, SUBSEQUENT: Primary | ICD-10-CM

## 2022-07-05 DIAGNOSIS — Z11.59 NEED FOR HEPATITIS C SCREENING TEST: ICD-10-CM

## 2022-07-05 PROCEDURE — 1159F MED LIST DOCD IN RCRD: CPT | Performed by: NURSE PRACTITIONER

## 2022-07-05 PROCEDURE — G0439 PPPS, SUBSEQ VISIT: HCPCS | Performed by: NURSE PRACTITIONER

## 2022-07-05 PROCEDURE — 96160 PT-FOCUSED HLTH RISK ASSMT: CPT | Performed by: NURSE PRACTITIONER

## 2022-07-05 RX ORDER — SILDENAFIL 100 MG/1
100 TABLET, FILM COATED ORAL DAILY PRN
Qty: 9 TABLET | Refills: 0 | Status: SHIPPED | OUTPATIENT
Start: 2022-07-05 | End: 2022-10-19

## 2022-07-05 NOTE — PROGRESS NOTES
The ABCs of the Annual Wellness Visit  Subsequent Medicare Wellness Visit    Chief Complaint   Patient presents with   • Medicare Wellness-subsequent   • Medication Administration     Wants to see if princess can refil medication        Subjective    History of Present Illness:  Morteza Veras is a 71 y.o. male who presents for a Subsequent Medicare Wellness Visit.    The following portions of the patient's history were reviewed and   updated as appropriate: allergies, current medications, past family history, past medical history, past social history, past surgical history and problem list.    Compared to one year ago, the patient feels his physical   health is the same.    Compared to one year ago, the patient feels his mental   health is the same.    Recent Hospitalizations:  He was not admitted to the hospital during the last year.       Current Medical Providers:  Patient Care Team:  Princess Cortez APRN as PCP - General (Family Medicine)  Jose Miguel Chu DPM as Consulting Physician (Podiatry)    Outpatient Medications Prior to Visit   Medication Sig Dispense Refill   • atorvastatin (Lipitor) 40 MG tablet Take 1 tablet by mouth Daily. 90 tablet 0   • ibuprofen (ADVIL,MOTRIN) 100 MG/5ML suspension Take  by mouth Every 6 (Six) Hours As Needed for Mild Pain .     • naproxen sodium (ALEVE) 220 MG tablet Take 220 mg by mouth 2 (Two) Times a Day As Needed.     • aspirin 81 MG EC tablet Take 81 mg by mouth Daily.       No facility-administered medications prior to visit.       No opioid medication identified on active medication list. I have reviewed chart for other potential  high risk medication/s and harmful drug interactions in the elderly.          Aspirin is on active medication list. Aspirin use is indicated based on review of current medical condition/s. Pros and cons of this therapy have been discussed today. Benefits of this medication outweigh potential harm.  Patient has been encouraged to continue  "taking this medication.  .      Patient Active Problem List   Diagnosis   • Left foot pain   • Sesamoiditis   • Tailor's bunionette, left     Advance Care Planning  Advance Directive is not on file.  ACP discussion was held with the patient during this visit. Patient does not have an advance directive, declines further assistance.          Objective    Vitals:    07/05/22 1038   BP: 118/70   Pulse: 97   SpO2: 100%   Weight: 61.6 kg (135 lb 14.4 oz)   Height: 177.8 cm (70\")   PainSc: 0-No pain     Estimated body mass index is 19.5 kg/m² as calculated from the following:    Height as of this encounter: 177.8 cm (70\").    Weight as of this encounter: 61.6 kg (135 lb 14.4 oz).    BMI is within normal parameters. No other follow-up for BMI required.      Does the patient have evidence of cognitive impairment? No    Physical Exam  Lab Results   Component Value Date    TRIG 79 07/01/2022    HDL 71 (H) 07/01/2022     (H) 07/01/2022    VLDL 14 07/01/2022            HEALTH RISK ASSESSMENT    Smoking Status:  Social History     Tobacco Use   Smoking Status Current Every Day Smoker   • Packs/day: 1.00   • Years: 40.00   • Pack years: 40.00   • Types: Cigarettes   • Start date: 1980   Smokeless Tobacco Never Used     Alcohol Consumption:  Social History     Substance and Sexual Activity   Alcohol Use Defer     Fall Risk Screen:    RESHMA Fall Risk Assessment was completed, and patient is at LOW risk for falls.Assessment completed on:7/5/2022    Depression Screening:  PHQ-2/PHQ-9 Depression Screening 7/5/2022   Retired PHQ-9 Total Score -   Retired Total Score -   Little Interest or Pleasure in Doing Things 0-->not at all   Feeling Down, Depressed or Hopeless 0-->not at all   PHQ-9: Brief Depression Severity Measure Score 0       Health Habits and Functional and Cognitive Screening:  Functional & Cognitive Status 7/5/2022   Do you have difficulty preparing food and eating? No   Do you have difficulty bathing yourself, " getting dressed or grooming yourself? No   Do you have difficulty using the toilet? No   Do you have difficulty moving around from place to place? No   Do you have trouble with steps or getting out of a bed or a chair? No   Current Diet Well Balanced Diet   Dental Exam Up to date   Eye Exam Up to date   Exercise (times per week) 7 times per week   Current Exercises Include Yard Work;Weightlifting;Team Sports   Do you need help using the phone?  No   Are you deaf or do you have serious difficulty hearing?  No   Do you need help with transportation? No   Do you need help shopping? No   Do you need help preparing meals?  No   Do you need help with housework?  No   Do you need help with laundry? No   Do you need help taking your medications? No   Do you need help managing money? No   Do you ever drive or ride in a car without wearing a seat belt? No   Have you felt unusual stress, anger or loneliness in the last month? Yes   Who do you live with? Spouse   If you need help, do you have trouble finding someone available to you? No   Have you been bothered in the last four weeks by sexual problems? No   Do you have difficulty concentrating, remembering or making decisions? No       Age-appropriate Screening Schedule:  Refer to the list below for future screening recommendations based on patient's age, sex and/or medical conditions. Orders for these recommended tests are listed in the plan section. The patient has been provided with a written plan.    Health Maintenance   Topic Date Due   • TDAP/TD VACCINES (1 - Tdap) 07/05/2023 (Originally 12/19/1969)   • INFLUENZA VACCINE  10/01/2022   • ZOSTER VACCINE  Completed              Assessment & Plan   CMS Preventative Services Quick Reference  Risk Factors Identified During Encounter  Cardiovascular Disease  Fall Risk-High or Moderate  Immunizations Discussed/Encouraged (specific Immunizations; Tdap, Pneumococcal 23, Shingrix and COVID19  Polypharmacy  Tobacco Use/Dependance  (use dotphrase .tobaccocessation for documentation)  The above risks/problems have been discussed with the patient.  Follow up actions/plans if indicated are seen below in the Assessment/Plan Section.  Pertinent information has been shared with the patient in the After Visit Summary.    Diagnoses and all orders for this visit:    1. Medicare annual wellness visit, subsequent (Primary)    2. Need for hepatitis C screening test  -     Hepatitis C Antibody; Future    Other orders  -     sildenafil (Viagra) 100 MG tablet; Take 1 tablet by mouth Daily As Needed for Erectile Dysfunction.  Dispense: 9 tablet; Refill: 0        Follow Up:   Return if symptoms worsen or fail to improve, for Recheck, or sooner as needed.     An After Visit Summary and PPPS were made available to the patient.          I spent 33 minutes caring for Morteza on this date of service. This time includes time spent by me in the following activities:preparing for the visit, counseling and educating the patient/family/caregiver, ordering medications, tests, or procedures and documenting information in the medical record

## 2022-07-12 DIAGNOSIS — Z72.0 TOBACCO ABUSE: Primary | ICD-10-CM

## 2022-07-12 DIAGNOSIS — R91.1 LUNG NODULE: ICD-10-CM

## 2022-07-12 DIAGNOSIS — Z87.891 PERSONAL HISTORY OF NICOTINE DEPENDENCE: ICD-10-CM

## 2022-07-12 DIAGNOSIS — F17.200 TOBACCO DEPENDENCE: ICD-10-CM

## 2022-09-08 ENCOUNTER — OFFICE VISIT (OUTPATIENT)
Dept: FAMILY MEDICINE CLINIC | Facility: CLINIC | Age: 72
End: 2022-09-08

## 2022-09-08 VITALS — HEIGHT: 70 IN | BODY MASS INDEX: 19.33 KG/M2 | WEIGHT: 135 LBS

## 2022-09-08 DIAGNOSIS — Z72.0 TOBACCO ABUSE: Chronic | ICD-10-CM

## 2022-09-08 DIAGNOSIS — J44.9 CHRONIC OBSTRUCTIVE PULMONARY DISEASE, UNSPECIFIED COPD TYPE: Chronic | ICD-10-CM

## 2022-09-08 DIAGNOSIS — J06.9 ACUTE URI: Primary | ICD-10-CM

## 2022-09-08 PROCEDURE — 99443 PR PHYS/QHP TELEPHONE EVALUATION 21-30 MIN: CPT | Performed by: NURSE PRACTITIONER

## 2022-09-08 RX ORDER — AZITHROMYCIN 250 MG/1
TABLET, FILM COATED ORAL
Qty: 6 TABLET | Refills: 0 | Status: SHIPPED | OUTPATIENT
Start: 2022-09-08 | End: 2023-01-19

## 2022-09-08 RX ORDER — METHYLPREDNISOLONE 4 MG/1
TABLET ORAL
Qty: 1 EACH | Refills: 0 | Status: SHIPPED | OUTPATIENT
Start: 2022-09-08 | End: 2023-01-19

## 2022-09-08 NOTE — PROGRESS NOTES
"Chief Complaint  URI (Head congestion, and chest. Runny nose)    Subjective        Morteza Veras presents to [facility] [place]  History of Present Illness     This was an audio and video enabled telemedicine encounter.    The patient presents today via Telehealth for complaints of chest congestion. He notes that he has head congestion and drainage that radiates into his chest. The patient adds that he has been sick since 09/03/2022. He notes that he has been taking cold and flu medication. The patient notes that he would like to receive a pneumonia vaccination in the future after he feels better. He states he will check his COVID-19 status. Recently went on a cruise to [place] and just got back last Sunday. Denies fever, chills, body aches. Reports symptoms began after he had been exposed to rainy and cool weather.     Objective   Vital Signs:  Ht 177.8 cm (70\")   Wt 61.2 kg (135 lb)   BMI 19.37 kg/m²   Estimated body mass index is 19.37 kg/m² as calculated from the following:    Height as of this encounter: 177.8 cm (70\").    Weight as of this encounter: 61.2 kg (135 lb).    BMI is within normal parameters. No other follow-up for BMI required.      Physical Exam     Deferred, telehealth visit.    Result Review :                Assessment and Plan   Diagnoses and all orders for this visit:    1. Acute URI (Primary)  -     azithromycin (Zithromax Z-Gino) 250 MG tablet; Take 2 po now and 1 po d2-5  Dispense: 6 tablet; Refill: 0  -     methylPREDNISolone (MEDROL) 4 MG dose pack; Take as directed on package instructions.  Dispense: 1 each; Refill: 0    2. Tobacco abuse    3. Chronic obstructive pulmonary disease, unspecified COPD type (HCC)  -     azithromycin (Zithromax Z-Gino) 250 MG tablet; Take 2 po now and 1 po d2-5  Dispense: 6 tablet; Refill: 0  -     methylPREDNISolone (MEDROL) 4 MG dose pack; Take as directed on package instructions.  Dispense: 1 each; Refill: " 0      I advised him to use his home COVID-19 test. Py called back after taking this and reports it as being negative. He is prescribed a Z-Gino and Medrol Dosepak as above due to his history of COPD and tobacco abuse. We have discussed him coming in for pneumonia vaccine and he says that he will do so after he gets over this illness. He will otherwise return as scheduled for chronic conditions or if his symptoms should persist or worsen. All questions and concerns are addressed with understanding noted. The patient is in agreement to above plan.       I spent 33 minutes caring for Morteza on this date of service. This time includes time spent by me in the following activities:preparing for the visit, counseling and educating the patient/family/caregiver, ordering medications, tests, or procedures and documenting information in the medical record  Follow Up   Return if symptoms worsen or fail to improve, for Recheck, or sooner as needed.  Patient was given instructions and counseling regarding his condition or for health maintenance advice. Please see specific information pulled into the AVS if appropriate.       Transcribed from ambient dictation for TRICIA Landin by ISAIAS ALMARAZ.  09/08/22   14:31 CDT    Patient verbalized consent to the visit recording.  I have personally performed the services described in this document as transcribed by the above individual, and it is both accurate and complete.  TRICIA Landin  9/8/2022  14:44 CDT    You have chosen to receive care through a telephone visit. Do you consent to use a telephone visit for your medical care today? yes

## 2022-10-10 ENCOUNTER — TELEPHONE (OUTPATIENT)
Dept: FAMILY MEDICINE CLINIC | Facility: CLINIC | Age: 72
End: 2022-10-10

## 2022-10-10 RX ORDER — ATORVASTATIN CALCIUM 40 MG/1
40 TABLET, FILM COATED ORAL DAILY
Qty: 90 TABLET | Refills: 1 | Status: SHIPPED | OUTPATIENT
Start: 2022-10-10 | End: 2023-01-19 | Stop reason: SDUPTHER

## 2022-10-19 RX ORDER — SILDENAFIL 100 MG/1
TABLET, FILM COATED ORAL
Qty: 9 TABLET | Refills: 0 | Status: SHIPPED | OUTPATIENT
Start: 2022-10-19 | End: 2023-01-19 | Stop reason: SDUPTHER

## 2022-10-31 ENCOUNTER — CLINICAL SUPPORT (OUTPATIENT)
Dept: FAMILY MEDICINE CLINIC | Facility: CLINIC | Age: 72
End: 2022-10-31

## 2022-10-31 DIAGNOSIS — Z23 NEED FOR INFLUENZA VACCINATION: Primary | ICD-10-CM

## 2022-10-31 PROCEDURE — 90662 IIV NO PRSV INCREASED AG IM: CPT | Performed by: NURSE PRACTITIONER

## 2022-10-31 PROCEDURE — G0008 ADMIN INFLUENZA VIRUS VAC: HCPCS | Performed by: NURSE PRACTITIONER

## 2023-01-19 ENCOUNTER — OFFICE VISIT (OUTPATIENT)
Dept: FAMILY MEDICINE CLINIC | Facility: CLINIC | Age: 73
End: 2023-01-19
Payer: MEDICARE

## 2023-01-19 ENCOUNTER — LAB (OUTPATIENT)
Dept: LAB | Facility: OTHER | Age: 73
End: 2023-01-19
Payer: MEDICARE

## 2023-01-19 VITALS
HEART RATE: 81 BPM | TEMPERATURE: 97 F | WEIGHT: 140 LBS | HEIGHT: 70 IN | BODY MASS INDEX: 20.04 KG/M2 | SYSTOLIC BLOOD PRESSURE: 110 MMHG | DIASTOLIC BLOOD PRESSURE: 70 MMHG | OXYGEN SATURATION: 100 %

## 2023-01-19 DIAGNOSIS — N52.9 ERECTILE DYSFUNCTION, UNSPECIFIED ERECTILE DYSFUNCTION TYPE: ICD-10-CM

## 2023-01-19 DIAGNOSIS — E78.2 MIXED HYPERLIPIDEMIA: Chronic | ICD-10-CM

## 2023-01-19 DIAGNOSIS — Z72.0 TOBACCO ABUSE: Chronic | ICD-10-CM

## 2023-01-19 DIAGNOSIS — U07.1 COVID: Primary | ICD-10-CM

## 2023-01-19 DIAGNOSIS — J06.9 ACUTE URI: ICD-10-CM

## 2023-01-19 DIAGNOSIS — J44.9 CHRONIC OBSTRUCTIVE PULMONARY DISEASE, UNSPECIFIED COPD TYPE: Chronic | ICD-10-CM

## 2023-01-19 LAB
FLUAV AG UPPER RESP QL IA.RAPID: NOT DETECTED
FLUBV AG UPPER RESP QL IA.RAPID: NOT DETECTED
SARS-COV-2 AG RESP QL IA.RAPID: DETECTED

## 2023-01-19 PROCEDURE — 87426 SARSCOV CORONAVIRUS AG IA: CPT | Performed by: NURSE PRACTITIONER

## 2023-01-19 PROCEDURE — 99214 OFFICE O/P EST MOD 30 MIN: CPT | Performed by: NURSE PRACTITIONER

## 2023-01-19 PROCEDURE — 87428 SARSCOV & INF VIR A&B AG IA: CPT | Performed by: NURSE PRACTITIONER

## 2023-01-19 RX ORDER — ATORVASTATIN CALCIUM 40 MG/1
40 TABLET, FILM COATED ORAL DAILY
Qty: 90 TABLET | Refills: 1 | Status: CANCELLED | OUTPATIENT
Start: 2023-01-19

## 2023-01-19 RX ORDER — METHYLPREDNISOLONE 4 MG/1
TABLET ORAL
Qty: 1 EACH | Refills: 0 | Status: SHIPPED | OUTPATIENT
Start: 2023-01-19

## 2023-01-19 RX ORDER — ATORVASTATIN CALCIUM 40 MG/1
40 TABLET, FILM COATED ORAL DAILY
Qty: 90 TABLET | Refills: 1 | Status: SHIPPED | OUTPATIENT
Start: 2023-01-19

## 2023-01-19 RX ORDER — SILDENAFIL 100 MG/1
100 TABLET, FILM COATED ORAL DAILY PRN
Qty: 9 TABLET | Refills: 0 | Status: CANCELLED | OUTPATIENT
Start: 2023-01-19

## 2023-01-19 RX ORDER — AZITHROMYCIN 250 MG/1
TABLET, FILM COATED ORAL
Qty: 6 TABLET | Refills: 0 | Status: SHIPPED | OUTPATIENT
Start: 2023-01-19

## 2023-01-19 RX ORDER — BROMPHENIRAMINE MALEATE, PSEUDOEPHEDRINE HYDROCHLORIDE, AND DEXTROMETHORPHAN HYDROBROMIDE 2; 30; 10 MG/5ML; MG/5ML; MG/5ML
5 SYRUP ORAL 4 TIMES DAILY PRN
Qty: 118 ML | Refills: 0 | Status: SHIPPED | OUTPATIENT
Start: 2023-01-19

## 2023-01-19 RX ORDER — SILDENAFIL 100 MG/1
100 TABLET, FILM COATED ORAL DAILY PRN
Qty: 9 TABLET | Refills: 0 | Status: SHIPPED | OUTPATIENT
Start: 2023-01-19

## 2023-01-19 NOTE — PROGRESS NOTES
"Chief Complaint  URI    Subjective        Morteza Veras presents to [facility]  History of Present Illness  Patient here today with sister complaining of cough low-grade fever nasal congestion ears popping rhinorrhea chills with nasal and chest congestion and states that his symptoms have been present now for the last week, persisting.  He also has a history of COPD hyperlipidemia ED and is needing refills on medications today.  Objective   Vital Signs:  /70   Pulse 81   Temp 97 °F (36.1 °C)   Ht 177.8 cm (70\")   Wt 63.5 kg (140 lb)   SpO2 100%   BMI 20.09 kg/m²   Estimated body mass index is 20.09 kg/m² as calculated from the following:    Height as of this encounter: 177.8 cm (70\").    Weight as of this encounter: 63.5 kg (140 lb).     BMI is within normal parameters. No other follow-up for BMI required.    Physical Exam  Vitals and nursing note reviewed.   Constitutional:       General: He is not in acute distress.     Appearance: Normal appearance. He is not ill-appearing, toxic-appearing or diaphoretic.   HENT:      Head: Normocephalic and atraumatic.      Right Ear: Ear canal and external ear normal. Decreased hearing noted. There is no impacted cerumen. Tympanic membrane is retracted.      Left Ear: Ear canal and external ear normal. Decreased hearing noted. There is no impacted cerumen. Tympanic membrane is retracted.      Ears:      Comments: Decreased hearing bilat which is a chronic finding for him     Nose: Congestion and rhinorrhea present.      Mouth/Throat:      Pharynx: Posterior oropharyngeal erythema present.      Comments: Mild erythema and cobblestoning appearance noted   Cardiovascular:      Rate and Rhythm: Normal rate and regular rhythm.      Heart sounds: Normal heart sounds. No murmur heard.    No friction rub. No gallop.   Pulmonary:      Effort: No respiratory distress.      Breath sounds: No stridor. Examination of the " right-upper field reveals decreased breath sounds. Examination of the left-upper field reveals decreased breath sounds. Examination of the right-middle field reveals decreased breath sounds. Examination of the left-middle field reveals decreased breath sounds. Examination of the right-lower field reveals decreased breath sounds. Examination of the left-lower field reveals decreased breath sounds. Decreased breath sounds present. No wheezing, rhonchi or rales.      Comments: Pulse ox on %, lung fields diminished throughout   Skin:     General: Skin is warm and dry.      Coloration: Skin is not jaundiced or pale.      Findings: No bruising, erythema, lesion or rash.   Neurological:      Mental Status: He is alert and oriented to person, place, and time.      Cranial Nerves: No cranial nerve deficit.      Motor: No weakness.      Coordination: Coordination normal.      Gait: Gait normal.   Psychiatric:         Mood and Affect: Mood normal.         Behavior: Behavior normal.         Thought Content: Thought content normal.         Judgment: Judgment normal.        Result Review :    CMP    CMP 7/1/22   Glucose 93   BUN 9   Creatinine 0.80   eGFR 94.6   Sodium 134 (A)   Potassium 4.0   Chloride 101   Calcium 8.7   Total Protein 6.0 (A)   Albumin 3.60   Globulin 2.4   Total Bilirubin 0.7   Alkaline Phosphatase 72   AST (SGOT) 25   ALT (SGPT) 16   Albumin/Globulin Ratio 1.5   BUN/Creatinine Ratio 11.3   Anion Gap 4.0 (A)   (A) Abnormal value       Comments are available for some flowsheets but are not being displayed.           CBC    CBC 7/1/22   WBC 5.92   RBC 4.37   Hemoglobin 14.2   Hematocrit 41.8   MCV 95.7   MCH 32.5   MCHC 34.0   RDW 12.9   Platelets 249           CBC w/diff    CBC w/Diff 7/1/22   WBC 5.92   RBC 4.37   Hemoglobin 14.2   Hematocrit 41.8   MCV 95.7   MCH 32.5   MCHC 34.0   RDW 12.9   Platelets 249   Neutrophil Rel % 43.9   Lymphocyte Rel % 36.7   Monocyte Rel % 14.7 (A)   Eosinophil Rel % 4.2    Basophil Rel % 0.5   (A) Abnormal value            Lipid Panel    Lipid Panel 7/1/22   Total Cholesterol 187   Triglycerides 79   HDL Cholesterol 71 (A)   VLDL Cholesterol 14   LDL Cholesterol  102 (A)   LDL/HDL Ratio 1.41   (A) Abnormal value            TSH    TSH 7/1/22   TSH 1.370           Electrolytes    Electrolytes 7/1/22   Sodium 134 (A)   Potassium 4.0   Chloride 101   Calcium 8.7   (A) Abnormal value                     Assessment and Plan   Diagnoses and all orders for this visit:    1. COVID (Primary)  -     Covid-19 + Flu A&B AG, Veritor  -     brompheniramine-pseudoephedrine-DM 30-2-10 MG/5ML syrup; Take 5 mL by mouth 4 (Four) Times a Day As Needed for Congestion or Cough.  Dispense: 118 mL; Refill: 0  -     methylPREDNISolone (MEDROL) 4 MG dose pack; Take as directed on package instructions.  Dispense: 1 each; Refill: 0  -     azithromycin (Zithromax Z-Gino) 250 MG tablet; Take 2 po now and 1 po d2-5  Dispense: 6 tablet; Refill: 0    2. Chronic obstructive pulmonary disease, unspecified COPD type (HCC)    3. Acute URI  -     Covid-19 + Flu A&B AG, Veritor  -     brompheniramine-pseudoephedrine-DM 30-2-10 MG/5ML syrup; Take 5 mL by mouth 4 (Four) Times a Day As Needed for Congestion or Cough.  Dispense: 118 mL; Refill: 0  -     methylPREDNISolone (MEDROL) 4 MG dose pack; Take as directed on package instructions.  Dispense: 1 each; Refill: 0  -     azithromycin (Zithromax Z-Gino) 250 MG tablet; Take 2 po now and 1 po d2-5  Dispense: 6 tablet; Refill: 0    4. Tobacco abuse    5. Mixed hyperlipidemia  -     atorvastatin (Lipitor) 40 MG tablet; Take 1 tablet by mouth Daily.  Dispense: 90 tablet; Refill: 1    6. Erectile dysfunction, unspecified erectile dysfunction type  -     sildenafil (VIAGRA) 100 MG tablet; Take 1 tablet by mouth Daily As Needed for Erectile Dysfunction.  Dispense: 9 tablet; Refill: 0    Patient swabbed for COVID and flu and he is positive for COVID is made aware of this in office.  Is  treated with Medrol Dosepak Z-Gino Bromfed as above.  Not a candidate unfortunately for pack Slo-Bid due to symptoms being present greater than 5 days.  He is advised to monitor his symptoms pulse ox and intake and output of fluids and vital signs at home.  If his symptoms should persist or worsen he is asked to go to urgent care or ER over the weekend.  Advised on quarantining guidelines and health department paperwork completed.  He is given refills on Viagra and Lipitor as above as well.  All questions and concerns addressed with understand verbalized.  Patient aware and in agreement to this plan.  He is once again encouraged to stop smoking, declines.     I spent 30 minutes caring for Morteza on this date of service. This time includes time spent by me in the following activities:preparing for the visit, reviewing tests, performing a medically appropriate examination and/or evaluation , counseling and educating the patient/family/caregiver, ordering medications, tests, or procedures and documenting information in the medical record  Follow Up   Return in 6 months (on 7/19/2023), or if symptoms worsen or fail to improve, for Recheck, or sooner as needed.  Patient was given instructions and counseling regarding his condition or for health maintenance advice. Please see specific information pulled into the AVS if appropriate.

## 2023-02-27 ENCOUNTER — TELEPHONE (OUTPATIENT)
Dept: FAMILY MEDICINE CLINIC | Facility: CLINIC | Age: 73
End: 2023-02-27
Payer: MEDICARE

## 2023-02-27 NOTE — TELEPHONE ENCOUNTER
Rubi Bhat MA   2/27/2023  4:06 PM CST   Contacted the patient and informed him of the message. He stated understanding and thanked me.     Princess Cortez, TRCIIA   2/27/2023  8:48 AM CST   Inform pt this was neg for anything acute, repeat in 1 year

## 2023-04-25 DIAGNOSIS — N52.9 ERECTILE DYSFUNCTION, UNSPECIFIED ERECTILE DYSFUNCTION TYPE: ICD-10-CM

## 2023-04-25 RX ORDER — SILDENAFIL 100 MG/1
TABLET, FILM COATED ORAL
Qty: 9 TABLET | Refills: 0 | Status: SHIPPED | OUTPATIENT
Start: 2023-04-25

## 2023-05-24 ENCOUNTER — EXTERNAL PBMM DATA (OUTPATIENT)
Dept: PHARMACY | Facility: OTHER | Age: 73
End: 2023-05-24
Payer: MEDICARE